# Patient Record
Sex: MALE | Race: WHITE | NOT HISPANIC OR LATINO | URBAN - METROPOLITAN AREA
[De-identification: names, ages, dates, MRNs, and addresses within clinical notes are randomized per-mention and may not be internally consistent; named-entity substitution may affect disease eponyms.]

---

## 2019-05-21 ASSESSMENT — MIFFLIN-ST. JEOR
SCORE: 2280.18
SCORE: 2187.2

## 2019-06-11 ASSESSMENT — MIFFLIN-ST. JEOR: SCORE: 2186.87

## 2019-06-16 ENCOUNTER — ANESTHESIA - HEALTHEAST (OUTPATIENT)
Dept: SURGERY | Facility: CLINIC | Age: 39
End: 2019-06-16

## 2019-06-17 ENCOUNTER — AMBULATORY - HEALTHEAST (OUTPATIENT)
Dept: OTHER | Facility: CLINIC | Age: 39
End: 2019-06-17

## 2019-06-17 ENCOUNTER — SURGERY - HEALTHEAST (OUTPATIENT)
Dept: SURGERY | Facility: CLINIC | Age: 39
End: 2019-06-17

## 2019-06-17 ASSESSMENT — MIFFLIN-ST. JEOR: SCORE: 2147.25

## 2019-07-08 ASSESSMENT — MIFFLIN-ST. JEOR: SCORE: 2132.77

## 2019-07-21 ENCOUNTER — ANESTHESIA - HEALTHEAST (OUTPATIENT)
Dept: SURGERY | Facility: CLINIC | Age: 39
End: 2019-07-21

## 2019-07-22 ENCOUNTER — SURGERY - HEALTHEAST (OUTPATIENT)
Dept: SURGERY | Facility: CLINIC | Age: 39
End: 2019-07-22

## 2019-07-22 ASSESSMENT — MIFFLIN-ST. JEOR: SCORE: 2116.89

## 2021-05-29 NOTE — ANESTHESIA POSTPROCEDURE EVALUATION
Patient: Carlos Zeng  LEFT HIP PRIMARY ARTHROPLASTY  Anesthesia type: spinal    Patient location: PACU  Last vitals:   Vitals Value Taken Time   /71 6/17/2019 11:00 AM   Temp 36.2  C (97.2  F) 6/17/2019 11:00 AM   Pulse 69 6/17/2019 11:03 AM   Resp 15 6/17/2019 11:03 AM   SpO2 89 % 6/17/2019 11:03 AM   Vitals shown include unvalidated device data.  Post vital signs: stable  Level of consciousness: awake and responds to simple questions  Post-anesthesia pain: pain controlled  Post-anesthesia nausea and vomiting: no  Pulmonary: unassisted, return to baseline  Cardiovascular: stable and blood pressure at baseline  Hydration: adequate  Anesthetic events: no    QCDR Measures:  ASA# 11 - Rosalina-op Cardiac Arrest: ASA11B - Patient did NOT experience unanticipated cardiac arrest  ASA# 12 - Rosalina-op Mortality Rate: ASA12B - Patient did NOT die  ASA# 13 - PACU Re-Intubation Rate: NA - No ETT / LMA used for case  ASA# 10 - Composite Anes Safety: ASA10A - No serious adverse event    Additional Notes:

## 2021-05-29 NOTE — ANESTHESIA PREPROCEDURE EVALUATION
Anesthesia Evaluation      Patient summary reviewed   No history of anesthetic complications     Airway   Mallampati: II  Neck ROM: full   Pulmonary - negative ROS and normal exam   (-) sleep apnea, not a smoker                         Cardiovascular - negative ROS and normal exam  Exercise tolerance: > or = 4 METS   Neuro/Psych - negative ROS     Endo/Other    (+) arthritis, obesity,      GI/Hepatic/Renal - negative ROS           Dental - normal exam                        Anesthesia Plan  Planned anesthetic: spinal  Decadron, Zofran.  Diprivan infusion.  Ketamine (0.5 mg/kg).  ASA 2   Induction: intravenous   Anesthetic plan and risks discussed with: patient  Anesthesia plan special considerations: antiemetics,   Post-op plan: routine recovery

## 2021-05-29 NOTE — ANESTHESIA CARE TRANSFER NOTE
Last vitals:   Vitals:    06/17/19 1034   BP: 133/65   Pulse: 77   Resp: 16   Temp: 36.2  C (97.2  F)   SpO2: 97%     Patient's level of consciousness is awake  Spontaneous respirations: yes  Maintains airway independently: yes  Dentition unchanged: yes  Oropharynx: oropharynx clear of all foreign objects    QCDR Measures:  ASA# 20 - Surgical Safety Checklist: WHO surgical safety checklist completed prior to induction    PQRS# 430 - Adult PONV Prevention: 4558F - Pt received => 2 anti-emetic agents (different classes) preop & intraop  ASA# 8 - Peds PONV Prevention: NA - Not pediatric patient, not GA or 2 or more risk factors NOT present  PQRS# 424 - Rosalina-op Temp Management: 4559F - At least one body temp DOCUMENTED => 35.5C or 95.9F within required timeframe  PQRS# 426 - PACU Transfer Protocol: - Transfer of care checklist used  ASA# 14 - Acute Post-op Pain: ASA14B - Patient did NOT experience pain >= 7 out of 10

## 2021-05-30 NOTE — ANESTHESIA PROCEDURE NOTES
Spinal Block    Patient location during procedure: OR  Start time: 7/22/2019 7:33 AM  End time: 7/22/2019 7:37 AM  Reason for block: primary anesthetic    Staffing:  Performing  Anesthesiologist: Deric Kline MD    Preanesthetic Checklist  Completed: patient identified, risks, benefits, and alternatives discussed, timeout performed, consent obtained, airway assessed, oxygen available, suction available, emergency drugs available and hand hygiene performed  Spinal Block  Patient position: sitting  Prep: ChloraPrep and site prepped and draped  Patient monitoring: heart rate, cardiac monitor, continuous pulse ox and blood pressure  Approach: midline  Location: L3-4  Injection technique: single-shot  Needle type: pencil-tip   Needle gauge: 24 G    Assessment  Sensory level: T6

## 2021-05-30 NOTE — ANESTHESIA CARE TRANSFER NOTE
Last vitals:   Vitals:    07/22/19 1025   BP: 164/84   Pulse: 85   Resp: 12   Temp: 36.1  C (97  F)   SpO2: 100%     Patient's level of consciousness is awake and drowsy  Spontaneous respirations: yes  Maintains airway independently: yes  Dentition unchanged: yes  Oropharynx: oropharynx clear of all foreign objects    QCDR Measures:  ASA# 20 - Surgical Safety Checklist: WHO surgical safety checklist completed prior to induction    PQRS# 430 - Adult PONV Prevention: 4558F - Pt received => 2 anti-emetic agents (different classes) preop & intraop  ASA# 8 - Peds PONV Prevention: NA - Not pediatric patient, not GA or 2 or more risk factors NOT present  PQRS# 424 - Rosalina-op Temp Management: 4559F - At least one body temp DOCUMENTED => 35.5C or 95.9F within required timeframe  PQRS# 426 - PACU Transfer Protocol: - Transfer of care checklist used  ASA# 14 - Acute Post-op Pain: ASA14B - Patient did NOT experience pain >= 7 out of 10

## 2021-05-30 NOTE — ANESTHESIA POSTPROCEDURE EVALUATION
Patient: Carlos Zneg  RIGHT HIP PRIMARY ARTHROPLASTY  Anesthesia type: spinal    Patient location: PACU  Last vitals:   Vitals Value Taken Time   /72 7/22/2019 11:26 AM   Temp 36.4  C (97.6  F) 7/22/2019 11:20 AM   Pulse 78 7/22/2019 11:27 AM   Resp 30 7/22/2019 11:25 AM   SpO2 100 % 7/22/2019 11:27 AM   Vitals shown include unvalidated device data.  Post vital signs: stable  Level of consciousness: awake and return to baseline  Post-anesthesia pain: pain controlled  Post-anesthesia nausea and vomiting: no  Pulmonary: unassisted, return to baseline  Cardiovascular: stable and blood pressure at baseline  Hydration: adequate  Anesthetic events: no, SAB resolved    QCDR Measures:  ASA# 11 - Rosalina-op Cardiac Arrest: ASA11B - Patient did NOT experience unanticipated cardiac arrest  ASA# 12 - Rosalina-op Mortality Rate: ASA12B - Patient did NOT die  ASA# 13 - PACU Re-Intubation Rate: NA - No ETT / LMA used for case  ASA# 10 - Composite Anes Safety: ASA10A - No serious adverse event    Additional Notes:

## 2021-05-30 NOTE — ANESTHESIA PREPROCEDURE EVALUATION
Anesthesia Evaluation      Patient summary reviewed   No history of anesthetic complications     Airway   Mallampati: I  Neck ROM: full   Pulmonary - negative ROS and normal exam    breath sounds clear to auscultation                         Cardiovascular - negative ROS and normal exam  Rhythm: regular  Rate: normal,         Neuro/Psych - negative ROS     Endo/Other    (+) arthritis, obesity,      GI/Hepatic/Renal - negative ROS           Dental - normal exam                        Anesthesia Plan  Planned anesthetic: spinal    ASA 2     Anesthetic plan and risks discussed with: patient  Anesthesia plan special considerations: antiemetics,   Post-op plan: routine recovery

## 2021-06-03 VITALS — BODY MASS INDEX: 37.98 KG/M2 | HEIGHT: 71 IN | WEIGHT: 271.25 LBS

## 2021-06-03 VITALS — WEIGHT: 264.5 LBS | BODY MASS INDEX: 37.03 KG/M2 | HEIGHT: 71 IN

## 2021-06-16 PROBLEM — Z96.641 H/O TOTAL HIP ARTHROPLASTY, RIGHT: Status: ACTIVE | Noted: 2019-07-22

## 2021-06-16 PROBLEM — M16.10 HIP ARTHRITIS: Status: ACTIVE | Noted: 2019-06-17

## 2021-06-19 NOTE — LETTER
Letter by Beth Camacho RN at      Author: Beth Camacho RN Service: -- Author Type: --    Filed:  Encounter Date: 6/17/2019 Status: (Other)       Honoring Choices Advance Care Planning  [facility]  [address] [place] [address]      6/17/2019    Carlos Zeng  [address] [facility] [address]    Dear Mr. Zeng,    We have reviewed the Health Care Directive dated 6/17/19 which you presented for addition to   your medical record. Unfortunately, our review indicates the document is not legally valid for   the following reason(s): A witness or notary are named as your health care agent.  In order to create a legal document you will need to have your signature re-notarized or witnessed by 2 people. Notaries and witnesses cannot be named as your health care agents per state law. In addition, only one of your witnesses can be employed by our health care system.     We greatly value the opportunity to assist you in documenting your choices and to honor your   wishes. We apologize for any inconvenience. We have several options to assist you in updating   your document so it meets legal requirements.       Health Care Directives and Advance Care Planning resources can be viewed and printed   for free at our web site:[email or web address].    Free group classes on Advance Care Planning and completing a Health Care Directive are  available at multiple locations and times. These classes are led by trained staff who will   provide information and guide you through a Health Care Directive. They can also review, notarize and add your Health Care Directive to your medical record. [place] for a class at [email or web address] or by calling [employer] at [phone] or toll free [phone].    COPIES of completed Health Care Directives can be brought or mailed to any of our   locations, including the address listed below. You can also email  a copy to [email or web address] .    For additional assistance or questions you can email us at [email or web address] or call [phone]    Sincerely,     Cher Rivera Advance Care Planning  [facility] and [address] [address]   Email us: [email or web address] Call us: [phone]  Visit at: [email or web address]